# Patient Record
(demographics unavailable — no encounter records)

---

## 2025-01-17 NOTE — ASSESSMENT
[FreeTextEntry1] : 52 year M present for Throat irritation 2/2 combination of PND and LPR   Flexible Laryngoscopy shows moderate postcricoid edema, mild arytenoids edema, moderate mucus production coating nasal cavity, bilateral vocal fold symmetrical mobile, no gross mass or lesions in larynx   Recommend: Laryngopharyngeal Reflux - Discussed Lifestyle changes as the most effective methods to improvement LPR. Weight Loss if overweight. Avoid foods that increase the level of acid in your stomach, including caffeinated/carbonated drinks. - Avoid foods that decrease the pressure in the lower esophagus, such as fatty foods, alcohol and peppermint. - Avoid large meals.Try to have an empty stomach 2 hours before going to bed. If still smoking please Quit. - Head of bed elevation when you lying down -LPR Handout Given -Discussed Sodium alginate as mechanical plug for preventing reflux - Discussed with Patient if they have not seen Gastroenterolgy in the past for endoscopy they should follow up as chronic reflux can causes mucosal changes in the lining of the lower esophagus - Send to Pharmacy Famotidine 20mg at bedtime   PND -Discussed Post-nasal drip occurs when mucus from the nasal passages and sinuses drains into the throat. If the mucus becomes thick, post-nasal drip can cause problems such as a sore throat, laryngitis, a cough, bad breath, hoarseness, and sometimes wheezing. -Discussed the importance of rinsing the sinus before using nasal spray so that excess mucus lining the nasal cavity can be wash away so medication can penetration the nose. -Send to Pharmacy Sinuse Rinse+ Flonase-Azelastine nasal spray -If Flonase spray is not effective can discuss additional other nasal sprays for treatment   partoid lesion seen on CT but unable to locate on US so with monitor closely  -Return to clinic in 4 months or sooner if new/worsen symptoms present

## 2025-01-17 NOTE — REASON FOR VISIT
[Initial Evaluation] : an initial evaluation for [Other: ______] : provided by STEPHANIE [FreeTextEntry2] : referred by PCP Dr Tran, throat discomfort and pain in neck [Interpreters_IDNumber] : [id number] [Interpreters_FullName] : VONNIE

## 2025-01-17 NOTE — REASON FOR VISIT
[Initial Evaluation] : an initial evaluation for [Other: ______] : provided by STEPHANIE [FreeTextEntry2] : referred by PCP Dr Tran, throat discomfort and pain in neck [Interpreters_IDNumber] : [id number] [Interpreters_FullName] : VONNIE

## 2025-04-09 NOTE — HISTORY OF PRESENT ILLNESS
[de-identified] : 52 year M follow up for Throat irritation 2/2 combination of PND and LPR, Parotid Lesion Last seen 1/17/25 - advised to start Flonase, Saline rinse and Famotidine.  States following medication regimen as recommended. States Right throat pain has worsened, especially with speaking. Denies dysphagia and dyspnea. Feels that he is hearing well.

## 2025-04-09 NOTE — REASON FOR VISIT
[Subsequent Evaluation] : a subsequent evaluation for [FreeTextEntry2] : throat pain and otalgia [Interpreters_IDNumber] : [id number] [Interpreters_FullName] : Ani [FreeTextEntry3] : Dayanara [TWNoteComboBox1] : False

## 2025-04-09 NOTE — ASSESSMENT
[FreeTextEntry1] : 52 year M follow up for Throat irritation 2/2 combination of PND and LPR, Parotid Lesion   [facility] radiology CT Neck with contrast shows No neck mass lesion or enlarged lymph nodes. 1.0cm upper superficial lobe left parotid gland lymph node, pleomorphic adenoma, or Warthin's tumor. FNA suggested  9/9/24 [facility] Ultrasound: FNA not performed as no mass identified in the right parotid gland  Flexible Laryngoscopy shows Mild postcricoid edema, mild arytenoids edema, Mild mucus production coating nasal cavity, bilateral vocal fold symmetrical mobile, no gross mass or lesions in larynx. No acute signs of infection  Recommend: Parotid Lesion -Not seen on US. FNA not performed. Continue to Monitor.  -Order Ultrasound Parotid  Throat Irritation / Laryngopharyngeal Reflux - Discussed Lifestyle changes as the most effective methods to improvement LPR. Weight Loss if overweight. Avoid foods that increase the level of acid in your stomach, including caffeinated/carbonated drinks. - Avoid foods that decrease the pressure in the lower esophagus, such as fatty foods, alcohol and peppermint. - Avoid large meals.Try to have an empty stomach 2 hours before going to bed. If still smoking please Quit. - Head of bed elevation when you lying down -LPR Handout Given -Discussed Sodium alginate as mechanical plug for preventing reflux - Discussed with Patient if they have not seen Gastroenterolgy in the past for endoscopy they should follow up as chronic reflux can causes mucosal changes in the lining of the lower esophagus - Continue Famotidine 20mg at bedtime - Start Magic Mouth Wash for the Oral Pain. No visible lesion or infection - Can consider further imaging next visit if no improvement to evaluate for eagle syndrome   PND -Discussed Post-nasal drip occurs when mucus from the nasal passages and sinuses drains into the throat. If the mucus becomes thick, post-nasal drip can cause problems such as a sore throat, laryngitis, a cough, bad breath, hoarseness, and sometimes wheezing. -Discussed the importance of rinsing the sinus before using nasal spray so that excess mucus lining the nasal cavity can be wash away so medication can penetration the nose. -Continue Sinus Rinse+ Flonase nasal spray -If Flonase spray is not effective can discuss additional other nasal sprays for treatment  -Return to clinic in 4 months or sooner if new/worsen symptoms present

## 2025-07-16 NOTE — ASSESSMENT
[FreeTextEntry1] : 52 year M follow up for Globus sensation/ Throat Pain 2/2 combination of PND and LPR, Parotid Lesion   6/20/2024 [facility] as per radiology CT Neck with contrast shows No neck mass lesion or enlarged lymph nodes. 1.0cm upper superficial lobe left parotid gland lymph node, pleomorphic adenoma, or Warthin's tumor. FNA suggested  9/9/24 [facility] Ultrasound: FNA not performed as no mass identified in the right parotid gland  4/9/25 Flexible Laryngoscopy shows Mild postcricoid edema, mild arytenoids edema, Mild mucus production coating nasal cavity, bilateral vocal fold symmetrical mobile, no gross mass or lesions in larynx. No acute signs of infection  Recommend: Throat Irritation / Laryngopharyngeal Reflux - Discussed Lifestyle changes as the most effective methods to improvement LPR. Weight Loss if overweight. Avoid foods that increase the level of acid in your stomach, including caffeinated/carbonated drinks. - Avoid foods that decrease the pressure in the lower esophagus, such as fatty foods, alcohol and peppermint. - Avoid large meals.Try to have an empty stomach 2 hours before going to bed. If still smoking please Quit. - Head of bed elevation when you lying down -LPR Handout Given -Discussed Sodium alginate as mechanical plug for preventing reflux - Discussed with Patient if they have not seen Gastroenterolgy in the past for endoscopy they should follow up as chronic reflux can causes mucosal changes in the lining of the lower esophagus - Continue Omeprazole 40mg daily - Continue Magic Mouth Wash for the Oral Pain. No visible lesion or infection - CT Neck w/wo contrast to evaluate for eagle syndrome   PND -Discussed Post-nasal drip occurs when mucus from the nasal passages and sinuses drains into the throat. If the mucus becomes thick, post-nasal drip can cause problems such as a sore throat, laryngitis, a cough, bad breath, hoarseness, and sometimes wheezing. -Discussed the importance of rinsing the sinus before using nasal spray so that excess mucus lining the nasal cavity can be wash away so medication can penetration the nose. -Continue Sinus Rinse+ Flonase nasal spray -If Flonase spray is not effective can discuss additional other nasal sprays for treatment  Parotid Lesion -Not seen on US. FNA not performed. Continue to Monitor.   -Return to clinic in 4 months or sooner if new/worsen symptoms present

## 2025-07-16 NOTE — REASON FOR VISIT
[Subsequent Evaluation] : a subsequent evaluation for [Other: ______] : provided by STEPHANIE [FreeTextEntry2] : throat pain  [Interpreters_IDNumber] : [id number] [Interpreters_FullName] : KADEN  [TWNoteComboBox1] : Ning

## 2025-07-16 NOTE — HISTORY OF PRESENT ILLNESS
[de-identified] : 52 year M follow up for Throat Pain 2/2 combination of PND and LPR, Parotid Lesion States despite nasal rinse and Flonase for PND as well as Omeprazole for LPRD  States Pain did not improve Feels there is still something in the throat Denies any recent difficulty with breathing, no change in voice or discharge, no unintentional weight loss

## 2025-07-16 NOTE — ASSESSMENT
[FreeTextEntry1] : 52 year M follow up for Globus sensation/ Throat Pain 2/2 combination of PND and LPR, Parotid Lesion   6/20/2024 [facility] as per radiology CT Neck with contrast shows No neck mass lesion or enlarged lymph nodes. 1.0cm upper superficial lobe left parotid gland lymph node, pleomorphic adenoma, or Warthin's tumor. FNA suggested  9/9/24 [facility] Ultrasound: FNA not performed as no mass identified in the right parotid gland  4/9/25 Flexible Laryngoscopy shows Mild postcricoid edema, mild arytenoids edema, Mild mucus production coating nasal cavity, bilateral vocal fold symmetrical mobile, no gross mass or lesions in larynx. No acute signs of infection  Recommend: Throat Irritation / Laryngopharyngeal Reflux - Discussed Lifestyle changes as the most effective methods to improvement LPR. Weight Loss if overweight. Avoid foods that increase the level of acid in your stomach, including caffeinated/carbonated drinks. - Avoid foods that decrease the pressure in the lower esophagus, such as fatty foods, alcohol and peppermint. - Avoid large meals.Try to have an empty stomach 2 hours before going to bed. If still smoking please Quit. - Head of bed elevation when you lying down -LPR Handout Given -Discussed Sodium alginate as mechanical plug for preventing reflux - Discussed with Patient if they have not seen Gastroenterolgy in the past for endoscopy they should follow up as chronic reflux can causes mucosal changes in the lining of the lower esophagus - Continue Omeprazole 40mg daily - Continue Magic Mouth Wash for the Oral Pain. No visible lesion or infection - CT Neck w/wo contrast to evaluate for eagle syndrome   PND -Discussed Post-nasal drip occurs when mucus from the nasal passages and sinuses drains into the throat. If the mucus becomes thick, post-nasal drip can cause problems such as a sore throat, laryngitis, a cough, bad breath, hoarseness, and sometimes wheezing. -Discussed the importance of rinsing the sinus before using nasal spray so that excess mucus lining the nasal cavity can be wash away so medication can penetration the nose. -Continue Sinus Rinse+ Flonase nasal spray -If Flonase spray is not effective can discuss additional other nasal sprays for treatment  Parotid Lesion -Not seen on US. FNA not performed. Continue to Monitor.   -Return to clinic in 4 months or sooner if new/worsen symptoms present

## 2025-07-16 NOTE — REASON FOR VISIT
[Subsequent Evaluation] : a subsequent evaluation for [Other: ______] : provided by STEPHANIE [FreeTextEntry2] : throat pain  [Interpreters_IDNumber] : [id number] [Interpreters_FullName] : KADEN  [TWNoteComboBox1] : Ning

## 2025-07-16 NOTE — HISTORY OF PRESENT ILLNESS
[de-identified] : 52 year M follow up for Throat Pain 2/2 combination of PND and LPR, Parotid Lesion States despite nasal rinse and Flonase for PND as well as Omeprazole for LPRD  States Pain did not improve Feels there is still something in the throat Denies any recent difficulty with breathing, no change in voice or discharge, no unintentional weight loss